# Patient Record
Sex: FEMALE | Race: WHITE | Employment: PART TIME | URBAN - METROPOLITAN AREA
[De-identification: names, ages, dates, MRNs, and addresses within clinical notes are randomized per-mention and may not be internally consistent; named-entity substitution may affect disease eponyms.]

---

## 2021-09-13 NOTE — PERIOP NOTES
\"Michaela\" stated on voicemail; Left generalized message regarding COVID requirements, a COVID test needs to be completed in order to proceed with procedures at [facility]. Left message regarding curbside COVID swabbing at [facility] Thursday, September 16th from [id number]. Call [facility] at [phone] Monday thru Friday with questions or concerns.

## 2021-09-15 NOTE — PROGRESS NOTES
Spoke with patient and informed the patient of COVID requirements, patient plans to completed COVID curbside testing at [facility] on Thursday September 16th, 2021 between [id number]. Patient verbalized understanding that COVID test is required to proceed with procedure. Provided patient with callback number [phone] with any additional questions or concerns.

## 2021-09-16 ENCOUNTER — TRANSCRIBE ORDER (OUTPATIENT)
Dept: REGISTRATION | Age: 66
End: 2021-09-16

## 2021-09-16 ENCOUNTER — HOSPITAL ENCOUNTER (OUTPATIENT)
Dept: LAB | Age: 66
Discharge: HOME | End: 2021-09-16
Payer: MEDICARE

## 2021-09-16 DIAGNOSIS — Z20.822 ENCOUNTER FOR PREOPERATIVE SCREENING LABORATORY TESTING FOR COVID-19 VIRUS: Primary | ICD-10-CM

## 2021-09-16 DIAGNOSIS — Z01.812 ENCOUNTER FOR PREOPERATIVE SCREENING LABORATORY TESTING FOR COVID-19 VIRUS: ICD-10-CM

## 2021-09-16 DIAGNOSIS — Z20.822 ENCOUNTER FOR PREOPERATIVE SCREENING LABORATORY TESTING FOR COVID-19 VIRUS: ICD-10-CM

## 2021-09-16 DIAGNOSIS — Z01.812 ENCOUNTER FOR PREOPERATIVE SCREENING LABORATORY TESTING FOR COVID-19 VIRUS: Primary | ICD-10-CM

## 2021-09-16 PROCEDURE — U0005 INFEC AGEN DETEC AMPLI PROBE: HCPCS

## 2021-09-16 RX ORDER — FAMOTIDINE 40 MG/1
40 TABLET, FILM COATED ORAL DAILY
COMMUNITY

## 2021-09-16 RX ORDER — ACETAMINOPHEN 500 MG
500 TABLET ORAL
COMMUNITY

## 2021-09-16 RX ORDER — CHOLECALCIFEROL (VITAMIN D3) 50 MCG
CAPSULE ORAL DAILY
COMMUNITY

## 2021-09-16 RX ORDER — SUCRALFATE 1 G/1
1 TABLET ORAL 4 TIMES DAILY
COMMUNITY

## 2021-09-16 NOTE — PERIOP NOTES
[address] [place] [address] Dr Green Preprocedure Instructions      1. On the day of your surgery, please report to registration located on the 2nd floor of the  [facility]. yes    2. You must have a responsible adult to drive you to the hospital, stay at the hospital during your procedure and drive you home. If they leave your procedure will not be started (no exceptions). yes    3. Do not have anything to eat or drink (including water, gum, mints, coffee, and juice) after midnight. This does not apply to the medications you were instructed to take by your physician. yesIf you are currently taking Plavix, Coumadin, Aspirin, or other blood-thinning agents, contact your physician for special instructions. not applicable. 4. If you are having a procedure that requires bowel prep: We recommend that you have only clear liquids the day before your procedure and begin your bowel prep by 5:00 pm.  You may continue to drink clear liquids until midnight. If for any reason you are not able to complete your prep please notify your physician immediately. yes    5. Have a list of all current medications, including vitamins, herbal supplements and any other over the counter medications. yes    6. If you wear glasses, contacts, dentures and/or hearing aids, they may be removed prior to procedure, please bring a case to store them in. yes    7. You should understand that if you do not follow these instructions your procedure may be cancelled. If your physical condition changes (I.e. fever, cold or flu) please contact your doctor as soon as possible. 8. It is important that you be on time.   If for any reason you are unable to keep your appointment please call [phone] the day of or your physicians office prior to your scheduled procedure

## 2021-09-17 LAB
SARS-COV-2, XPLCVT: NOT DETECTED
SOURCE, COVRS: NORMAL

## 2021-09-20 ENCOUNTER — HOSPITAL ENCOUNTER (OUTPATIENT)
Age: 66
Setting detail: OUTPATIENT SURGERY
Discharge: HOME | End: 2021-09-20
Attending: INTERNAL MEDICINE | Admitting: INTERNAL MEDICINE
Payer: MEDICARE

## 2021-09-20 ENCOUNTER — ANESTHESIA EVENT (OUTPATIENT)
Dept: ENDOSCOPY | Age: 66
End: 2021-09-20
Payer: MEDICARE

## 2021-09-20 ENCOUNTER — ANESTHESIA (OUTPATIENT)
Dept: ENDOSCOPY | Age: 66
End: 2021-09-20
Payer: MEDICARE

## 2021-09-20 VITALS
HEIGHT: 62 IN | RESPIRATION RATE: 18 BRPM | HEART RATE: 82 BPM | WEIGHT: 115.3 LBS | SYSTOLIC BLOOD PRESSURE: 129 MMHG | BODY MASS INDEX: 21.22 KG/M2 | DIASTOLIC BLOOD PRESSURE: 71 MMHG | TEMPERATURE: 98.1 F | OXYGEN SATURATION: 98 %

## 2021-09-20 PROCEDURE — 77030021593 HC FCPS BIOP ENDOSC BSC -A: Performed by: INTERNAL MEDICINE

## 2021-09-20 PROCEDURE — 74011000250 HC RX REV CODE- 250: Performed by: NURSE ANESTHETIST, CERTIFIED REGISTERED

## 2021-09-20 PROCEDURE — 2709999900 HC NON-CHARGEABLE SUPPLY: Performed by: INTERNAL MEDICINE

## 2021-09-20 PROCEDURE — 74011250636 HC RX REV CODE- 250/636: Performed by: INTERNAL MEDICINE

## 2021-09-20 PROCEDURE — 74011250636 HC RX REV CODE- 250/636: Performed by: NURSE ANESTHETIST, CERTIFIED REGISTERED

## 2021-09-20 PROCEDURE — 88305 TISSUE EXAM BY PATHOLOGIST: CPT

## 2021-09-20 PROCEDURE — 76060000031 HC ANESTHESIA FIRST 0.5 HR: Performed by: INTERNAL MEDICINE

## 2021-09-20 PROCEDURE — 76040000019: Performed by: INTERNAL MEDICINE

## 2021-09-20 RX ORDER — PROPOFOL 10 MG/ML
INJECTION, EMULSION INTRAVENOUS AS NEEDED
Status: DISCONTINUED | OUTPATIENT
Start: 2021-09-20 | End: 2021-09-20 | Stop reason: HOSPADM

## 2021-09-20 RX ORDER — SODIUM CHLORIDE 9 MG/ML
INJECTION, SOLUTION INTRAVENOUS
Status: DISCONTINUED | OUTPATIENT
Start: 2021-09-20 | End: 2021-09-20 | Stop reason: HOSPADM

## 2021-09-20 RX ORDER — ATROPINE SULFATE 0.1 MG/ML
0.4 INJECTION INTRAVENOUS
Status: DISCONTINUED | OUTPATIENT
Start: 2021-09-20 | End: 2021-09-20 | Stop reason: HOSPADM

## 2021-09-20 RX ORDER — LIDOCAINE HYDROCHLORIDE 20 MG/ML
INJECTION, SOLUTION EPIDURAL; INFILTRATION; INTRACAUDAL; PERINEURAL AS NEEDED
Status: DISCONTINUED | OUTPATIENT
Start: 2021-09-20 | End: 2021-09-20 | Stop reason: HOSPADM

## 2021-09-20 RX ORDER — NALOXONE HYDROCHLORIDE 0.4 MG/ML
0.4 INJECTION, SOLUTION INTRAMUSCULAR; INTRAVENOUS; SUBCUTANEOUS
Status: DISCONTINUED | OUTPATIENT
Start: 2021-09-20 | End: 2021-09-20 | Stop reason: HOSPADM

## 2021-09-20 RX ORDER — SODIUM CHLORIDE 9 MG/ML
50 INJECTION, SOLUTION INTRAVENOUS CONTINUOUS
Status: DISCONTINUED | OUTPATIENT
Start: 2021-09-20 | End: 2021-09-20 | Stop reason: HOSPADM

## 2021-09-20 RX ORDER — PROPOFOL 10 MG/ML
INJECTION, EMULSION INTRAVENOUS
Status: DISCONTINUED | OUTPATIENT
Start: 2021-09-20 | End: 2021-09-20 | Stop reason: HOSPADM

## 2021-09-20 RX ORDER — FLUMAZENIL 0.1 MG/ML
0.2 INJECTION INTRAVENOUS
Status: DISCONTINUED | OUTPATIENT
Start: 2021-09-20 | End: 2021-09-20 | Stop reason: HOSPADM

## 2021-09-20 RX ORDER — DEXTROMETHORPHAN/PSEUDOEPHED 2.5-7.5/.8
1.2 DROPS ORAL
Status: DISCONTINUED | OUTPATIENT
Start: 2021-09-20 | End: 2021-09-20 | Stop reason: HOSPADM

## 2021-09-20 RX ORDER — MIDAZOLAM HYDROCHLORIDE 1 MG/ML
.25-5 INJECTION, SOLUTION INTRAMUSCULAR; INTRAVENOUS
Status: DISCONTINUED | OUTPATIENT
Start: 2021-09-20 | End: 2021-09-20 | Stop reason: HOSPADM

## 2021-09-20 RX ORDER — EPINEPHRINE 0.1 MG/ML
1 INJECTION INTRACARDIAC; INTRAVENOUS
Status: DISCONTINUED | OUTPATIENT
Start: 2021-09-20 | End: 2021-09-20 | Stop reason: HOSPADM

## 2021-09-20 RX ADMIN — LIDOCAINE HYDROCHLORIDE 60 MG: 20 INJECTION, SOLUTION INTRAVENOUS at 14:37

## 2021-09-20 RX ADMIN — SODIUM CHLORIDE 50 ML/HR: 9 INJECTION, SOLUTION INTRAVENOUS at 12:52

## 2021-09-20 RX ADMIN — PROPOFOL 70 MG: 10 INJECTION, EMULSION INTRAVENOUS at 14:37

## 2021-09-20 RX ADMIN — SODIUM CHLORIDE: 9 INJECTION, SOLUTION INTRAVENOUS at 14:28

## 2021-09-20 RX ADMIN — PROPOFOL INJECTABLE EMULSION 100 MCG/KG/MIN: 10 INJECTION, EMULSION INTRAVENOUS at 14:37

## 2021-09-20 NOTE — H&P
The patient is a 77year old female who presents with a complaint of Diarrhea. Note for \"Diarrhea\": Patient is a 77year old female who presents today for follow up. Seen 5/3/21 for evaluation of diarrhea, black stools and epigastric pain. Her calprotectin, ESR, CRP, Stool culture, O+P, elastase, CMP, CBC were unremarkable. However her stool PCR was positive for C. diff and yersinia enterocolitica. She was given vancomycin for C. Diff and famotidine for epigastric pain/black stools. Procedures were scheduled for sept- EGD/colon. She finished vancomycin last Wednesday. No more diarrhea. She feels great. She is continuing the probiotic. She is having 2-4 BM's a day which is still more than usual for her. Typically has 3 a day. She notes she is eating more frequent/small meals so this could contribute to the more frequent BM's  No fever/chills. No blood in the stool or black/tarry stool  No abdominal pain  Her weight is stable. She did loose 40 lbs since January but this has stabilized. She is monitoring her weight. No N/V. She reflux or heartburn. Even with famotidine still with epigastric pressure- it comes and goes. More noticeable in the evening. Some worsening after eating. No NSAID use. Switched to tylenol. She has cut back on alcohol- only a couple beers a week. She has tried nicotine gum to quit smoking. No drug use. Problem List/Past Medical Luis Parker; 5/24/2021 10:46 AM)  Anxiety (F41.9)    Seizures (R56.9)    Abdominal pain (R10.9)    Black stools (K92.1)    Epigastric pain (R10.13)    Diarrhea (R19.7)   Last colonoscopy 4-5 years ago in [facility]- per patient no hx of polyps  Weight loss (R63.4)      Past Surgical History Luis Parker; 5/24/2021 10:46 AM)  Foot Surgery - Right    Tubal Ligation   [1987]:   Tonsillectomy    Lumpectomy   Bilateral.    Allergies Luis Parker; 5/24/2021 10:46 AM)  No Known Drug Allergies   [04/30/2021]:    Medication History Luis Parker; 5/24/2021 10:46 AM)  Pantoprazole Sodium  (20MG Tablet DR, 1 (one) Tablet Oral every morning, Taken starting 05/03/2021) Active. Suprep Bowel Prep Kit  (17.5-3.13-1.6GM/177ML Solution, 1 (one) KIt container Millilit Oral as directed, Taken starting 05/03/2021) Active. Famotidine  (40MG Tablet, 1 (one) Tablet Oral daily, Taken starting 05/19/2021) Active. Folic Acid  (1MG Tablet, Oral) Active. metroNIDAZOLE  (250MG Tablet, Oral) Active. Medications Reconciled         Review of Systems (Ashok Hawley; 5/24/2021 10:46 AM)  General Not Present- Chronic Fatigue, Poor Appetite, Weight Gain and Weight Loss. Skin Not Present- Itching, Rash and Skin Color Changes. HEENT Not Present- Hearing Loss and Vertigo. Respiratory Not Present- Difficulty Breathing and TB exposure. Cardiovascular Not Present- Chest Pain, Use of Antibiotics before Dental Procedures and Use of Blood Thinners. Gastrointestinal Present- See HPI. Musculoskeletal Not Present- Arthritis, Hip Replacement Surgery and Knee Replacement Surgery. Neurological Not Present- Weakness. Psychiatric Not Present- Depression. Endocrine Not Present- Diabetes and Thyroid Problems. Hematology Not Present- Anemia. Vitals Ashok Hawley; 5/24/2021 10:49 AM)  5/24/2021 10:46 AM  Weight: 114 lb   Height: 62 in   Body Surface Area: 1.51 m²   Body Mass Index: 20.85 kg/m²    BP: 133/85(Sitting, Left Arm, Standard)              Assessment & Plan (Hanane Roberts PA-C; 5/24/2021 11:16 AM)  Diarrhea (R19.7)  Story: Last colonoscopy 4-5 years ago in [facility]- per patient no hx of polyps  Impression: Diarrhea which she describes as urgent, watery and every hour after 5 weeks of IV antibiotics for osteomyelitis. PCP empirically gave flagyl to which she improved but 1 week later diarrhea returned. She notes C. Diff was not checked but she did have WBC's in stool and an elevated CRP so she was given another round of flagyl and now feels better, currently on.   Will recheck full stool PCR and panel including C. diff. Complete prescribed antibiotic for now. If studies are normal proceed with colonoscopy with random colon bx for microscopic colitis.    ------------------------------------  C. Diff and yersinia are positive on PCR. Finished flagyl Wednesday and diarrhea returned saturday. Recommend she do vancomycin for 10 days. Recommend florastor for at least 8 weeks  she notes she does eat a lot of pork-->discussed proper cooking and handwashing/cleaning cutting boards. discussed yersinia is typically self limiting and they do not recommend treating with antibiotics. C. diff however should be treated due to risk of toxic megacolon, dehydration, etc.  will switch from PPI to H2 blocker to avoid recurrence of C. diff  ---------------------  Finished vancomycin last Wednesday- diarrhea resolved and has not returned  She will continue probiotic and use caution with future antibiotic use. Epigastric pain (R10.13)  Impression: Ongoing even with famotidine (had to stop PPI due to C. Diff)  No more motrin- switched to tylenol  Has EGD in Sept.  Will add carafate QID. Current Plans  Started Carafate 1GM, 1 (one) Tablet dissolve in capful of water, take prior to meals and bedtime, #120, 30 days starting 05/24/2021, Ref. x1.  Weight loss (R63.4)  Impression: Over 40 lb weight loss since january. 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 Stable since last month. Proceeding with labs and EGD/colon. Consider CT if EGD/colon are normal and weight loss continues. Current Plans  Patient is to call me for any questions or concerns. Pt Education - How to access health information online: discussed with patient and provided information.   Signed by Marylen Batten, PA-C (5/24/2021 11:16 AM)

## 2021-09-20 NOTE — PROGRESS NOTES
Endoscopy discharge instructions have been reviewed and given to patient. The patient verbalized understanding and acceptance of instructions. Dr. Steff Junior discussed with patient procedure findings and next steps.

## 2021-09-20 NOTE — PROCEDURES
Marie Bell M.D.  [phone]           2021                EGD Operative Report  Lloyd Baxter  :  1955  Balje[facility] Mathew Medical Record Number:  [id number]      Indication:  Abdominal pain, epigastric     : Douglas Aleman MD    Referring Provider:  Jessica Frost MD      Anesthesia/Sedation:  MAC anesthesia    Airway assessment: No airway problems anticipated    Pre-Procedural Exam:      Airway: clear, no airway problems anticipated  Heart: RRR, without gallops or rubs  Lungs: clear bilaterally without wheezes, crackles, or rhonchi  Abdomen: soft, nontender, nondistended, bowel sounds present  Mental Status: awake, alert and oriented to person, place and time       Procedure Details     After infomed consent was obtained for the procedure, with all risks and benefits of procedure explained the patient was taken to the endoscopy suite and placed in the left lateral decubitus position. Following sequential administration of sedation as per above, the endoscope was inserted into the mouth and advanced under direct vision to second portion of the duodenum. A careful inspection was made as the gastroscope was withdrawn, including a retroflexed view of the proximal stomach; findings and interventions are described below. Findings:   Esophagus:Mucosa within normal through the esophagus, evidence of mild erythema noted at the irregular Z-line, no ulcer or lesions seen otherwise. Stomach: Mild erythema noted in the antrum, otherwise mucosa within normal.  Duodenum/jejunum: normal    Therapies:  none    Specimens: Gastric and duodenum biopsies obtained           Complications:   None; patient tolerated the procedure well. EBL:  None.            Impression:    Mild gastropathy and minimal esophagitis, otherwise mucosa within normal.       Recommendations:    -Continue acid suppression.  -Await pathology.  -Continue with anti-reflux measures  -Follow-up office visit    Joseluis Carty MD

## 2021-09-20 NOTE — PROCEDURES
Young Velásquez M.D.  [phone]            2021          Colonoscopy Operative Report  Lala Bertrand  :  1955  [facility] Medical Record Number:  [id number]      Indications:    Diarrhea     :  Isabela Rico MD    Referring Provider: Marlene Oviedo MD    Sedation:  MAC anesthesia    Pre-Procedural Exam:      Airway: clear,  No airway problems anticipated  Heart: RRR, without gallops or rubs  Lungs: clear bilaterally without wheezes, crackles, or rhonchi  Abdomen: soft, nontender, nondistended, bowel sounds present  Mental Status: awake, alert and oriented to person, place and time     Procedure Details:  After informed consent was obtained with all risks and benefits of procedure explained and preoperative exam completed, the patient was taken to the endoscopy suite and placed in the left lateral decubitus position. Upon sequential sedation as per above, a digital rectal exam was performed. The Olympus videocolonoscope  was inserted in the rectum and carefully advanced to the cecum, which was identified by the ileocecal valve and appendiceal orifice, terminal ileum. The quality of preparation was good. The colonoscope was slowly withdrawn with careful inspection and evaluation between folds. Retroflexion in the rectum was performed. Findings:   Terminal Ileum: normal  Cecum: normal  Ascending Colon: normal  Transverse Colon: normal  Descending Colon: normal  Sigmoid: no mucosal lesion appreciated  mild diverticulosis; Rectum: no mucosal lesion appreciated  Grade 1 internal hemorrhoid(s); Interventions:  none    Specimen Removed:  none    Complications: None. EBL:  None. Impression:  Mild sigmoid diverticulosis and small internal hemorrhoids, otherwise mucosa within normal                         Diarrhea was related to C. Diff and has subsided after treatment.     Recommendations:  -Repeat colonoscopy in 10 years   -High fiber diet.    -Resume normal medication(s). Discharge Disposition:  Home in the company of a  when able to ambulate.     Mare Varma MD  9/20/2021  3:01 PM

## 2021-09-20 NOTE — PROGRESS NOTES
Rory Matias  1955  [id number]    Situation:  Verbal report received from: [facility] RN   Procedure: Procedure(s):  COLONOSCOPY  ESOPHAGOGASTRODUODENOSCOPY (EGD)  ESOPHAGOGASTRODUODENAL (EGD) BIOPSY    Background:    Preoperative diagnosis: EPIGASTRIC PAIN  Postoperative diagnosis: Mild gastritis, mild esophagitis. Diverticulosis, hemorrhoids. :  Dr. Alexis Rubio  Assistant(s): Endoscopy RN-1: Khushboo Valiente RN  Endoscopy RN-2: Yareli Girard RN    Specimens:   ID Type Source Tests Collected by Time Destination   1 : gastric biopsy Preservative Gastric  Oren Juarez MD 9/20/2021 1440 Pathology   2 : duodenum biopsy Preservative Duodenum  Oren Juarez MD 9/20/2021 1441 Pathology     H. Pylori  no    Assessment:    Anesthesia gave intra-procedure sedation and medications, see anesthesia flow sheet no    Intravenous fluids: NS@ KVO     Vital signs stable     Abdominal assessment: round and soft     Recommendation:  Discharge patient per MD order.   Return to floor  Family or Friend   Permission to share finding with family or friend yes

## 2021-09-20 NOTE — DISCHARGE INSTRUCTIONS
[facility]. Chioma Loco M.D.  [phone]                 COLON and EGD DISCHARGE INSTRUCTIONS    2021    Lu Olguin  :  1955  [facility] Medical Record Number:  [id number]      DISCOMFORT:  Sore throat- throat lozenges or warm salt water gargle  Redness at IV site- apply warm compress to area; if redness or soreness persist- contact your physician  There may be a slight amount of blood passed from the rectum  Gaseous discomfort- walking, belching will help relieve any discomfort  You may not operate a vehicle for 12 hours  You may not engage in an occupation involving machinery or appliances for rest of today  You may not drink alcoholic beverages for at least 12 hours  Avoid making any critical decisions for at least 24 hour  DIET:   High fiber diet. GERD diet: avoid fried and fatty foods. peppermint, chocolate, alcohol, coffee, citrus fruits and juices, tomoato products; avoid lying down for 2 to 3 hours after eating.   - however -  remember your colon is empty and a heavy meal will produce gas. Avoid these foods:  vegetables, fried / greasy foods, carbonated drinks for today     ACTIVITY:  You may  resume your normal daily activities it is recommended that you spend the remainder of the day resting -  avoid any strenuous activity and driving. CALL M.D. ANY SIGN OF:   Increasing pain, nausea, vomiting  Abdominal distension (swelling)  New increased bleeding (oral or rectal)  Fever (chills)  Pain in chest area  Bloody discharge from nose or mouth  Shortness of breath      Follow-up Instructions:   Call Dr. Lyric Michelle if any questions at [phone]. Results of procedure / biopsy in 7 to 10 days, we will call you with these results. Your endoscopy showed mild gastritis and very mild esophagitis, biopsies were obtained. Will need to follow anti-reflux measures and take Pepcid or Gaviscon on as needed basis.    Your colonoscopy showed mild diverticulosis and small internal hemorrhoids, otherwise looked within normal. Repeat colonoscopy in 10 years.

## 2021-09-20 NOTE — ANESTHESIA POSTPROCEDURE EVALUATION
Procedure(s):  COLONOSCOPY  ESOPHAGOGASTRODUODENOSCOPY (EGD)  ESOPHAGOGASTRODUODENAL (EGD) BIOPSY. MAC    Anesthesia Post Evaluation      Multimodal analgesia: multimodal analgesia not used between 6 hours prior to anesthesia start to PACU discharge  Patient location during evaluation: PACU  Patient participation: complete - patient participated  Level of consciousness: awake  Pain management: adequate  Airway patency: patent  Anesthetic complications: no  Cardiovascular status: acceptable, blood pressure returned to baseline and hemodynamically stable  Respiratory status: acceptable  Hydration status: acceptable  Post anesthesia nausea and vomiting:  controlled  Final Post Anesthesia Temperature Assessment:  Normothermia (36.0-37.5 degrees C)      INITIAL Post-op Vital signs:   Vitals Value Taken Time   /71 09/20/21 1525   Temp 36.7 °C (98.1 °F) 09/20/21 1504   Pulse 85 09/20/21 1527   Resp 19 09/20/21 1527   SpO2 99 % 09/20/21 1526   Vitals shown include unvalidated device data.

## 2021-09-20 NOTE — PERIOP NOTES
1436  Anesthesia staff at patient's bedside administering anesthesia and monitoring patients vital signs throughout procedure. See anesthesia note. Post procedure, report received from Ignacia GUTIÉRREZ. 1456  Endoscope was pre-cleaned at bedside immediately following procedure by endo Ashley yuen RN.     6642  Patient tolerated procedure. Abdomen soft and patient arousable and voices no complaints. Patient transported to endoscopy recovery area. Report given to post procedure Katharine VIVEROS.

## 2021-09-20 NOTE — ANESTHESIA PREPROCEDURE EVALUATION
Anesthetic History   No history of anesthetic complications            Review of Systems / Medical History  Patient summary reviewed and pertinent labs reviewed    Pulmonary          Smoker         Neuro/Psych   Within defined limits           Cardiovascular  Within defined limits                     GI/Hepatic/Renal  Within defined limits              Endo/Other  Within defined limits           Other Findings              Physical Exam    Airway  Mallampati: II      Mouth opening: Normal     Cardiovascular    Rhythm: regular  Rate: normal         Dental    Dentition: Poor dentition     Pulmonary  Breath sounds clear to auscultation               Abdominal  GI exam deferred       Other Findings            Anesthetic Plan    ASA: 2  Anesthesia type: MAC          Induction: Intravenous  Anesthetic plan and risks discussed with: Patient

## 2023-05-14 RX ORDER — ACETAMINOPHEN 500 MG
500 TABLET ORAL EVERY 6 HOURS PRN
COMMUNITY

## 2023-05-14 RX ORDER — SUCRALFATE 1 G/1
1 TABLET ORAL 4 TIMES DAILY
COMMUNITY

## 2023-05-14 RX ORDER — FAMOTIDINE 40 MG/1
40 TABLET, FILM COATED ORAL DAILY
COMMUNITY

## (undated) DEVICE — CATH IV AUTOGRD BC PNK 20GA 25 -- INSYTE

## (undated) DEVICE — (D)SENSOR RMFG 02 PULS OXMTR -- DISC BY MFR USE ITEM 133445

## (undated) DEVICE — BAG SPEC BIOHZRD 10 X 10 IN --

## (undated) DEVICE — BAG BELONG PT PERS CLEAR HANDL

## (undated) DEVICE — Device

## (undated) DEVICE — CUFF RMFG BP INF SZ 11 DISP -- LAWSON OEM ITEM 238915

## (undated) DEVICE — CONTAINER SPEC 20 ML LID NEUT BUFF FORMALIN 10 % POLYPR STS

## (undated) DEVICE — SET GRAV CK VLV NEEDLESS ST 3 GANGED 4WAY STPCOCK HI FLO 10

## (undated) DEVICE — ELECTRODE,RADIOTRANSLUCENT,FOAM,3PK: Brand: MEDLINE

## (undated) DEVICE — SIMPLICITY FLUFF UNDERPAD 23X36, MODERATE: Brand: SIMPLICITY

## (undated) DEVICE — 1200 GUARD II KIT W/5MM TUBE W/O VAC TUBE: Brand: GUARDIAN

## (undated) DEVICE — FORCEPS BX L240CM JAW DIA2.8MM L CAP W/ NDL MIC MESH TOOTH

## (undated) DEVICE — KIT COLON W/ 1.1OZ LUB AND 2 END

## (undated) DEVICE — 3M™ CUROS™ DISINFECTING CAP FOR NEEDLELESS CONNECTORS 270/CARTON 20 CARTONS/CASE CFF1-270: Brand: CUROS™

## (undated) DEVICE — CANN NASAL O2 CAPNOGRAPHY AD -- FILTERLINE

## (undated) DEVICE — SOLIDIFIER MEDC 1200ML -- CONVERT TO 356117

## (undated) DEVICE — BITEBLOCK ENDOSCP 60FR MAXI WHT POLYETH STURDY W/ VELC WVN